# Patient Record
Sex: MALE | Race: WHITE | NOT HISPANIC OR LATINO | Employment: FULL TIME | URBAN - NONMETROPOLITAN AREA
[De-identification: names, ages, dates, MRNs, and addresses within clinical notes are randomized per-mention and may not be internally consistent; named-entity substitution may affect disease eponyms.]

---

## 2019-01-16 DIAGNOSIS — M25.562 LEFT KNEE PAIN, UNSPECIFIED CHRONICITY: Primary | ICD-10-CM

## 2025-04-14 ENCOUNTER — OFFICE VISIT (OUTPATIENT)
Dept: GASTROENTEROLOGY | Facility: CLINIC | Age: 40
End: 2025-04-14
Payer: COMMERCIAL

## 2025-04-14 VITALS
WEIGHT: 152 LBS | DIASTOLIC BLOOD PRESSURE: 60 MMHG | SYSTOLIC BLOOD PRESSURE: 115 MMHG | HEIGHT: 70 IN | HEART RATE: 67 BPM | BODY MASS INDEX: 21.76 KG/M2

## 2025-04-14 DIAGNOSIS — K52.9 COLITIS: ICD-10-CM

## 2025-04-14 DIAGNOSIS — K76.9 HEPATIC LESION: ICD-10-CM

## 2025-04-14 DIAGNOSIS — B18.2 CHRONIC HEPATITIS C WITHOUT HEPATIC COMA: Primary | ICD-10-CM

## 2025-04-14 DIAGNOSIS — R63.4 UNINTENTIONAL WEIGHT LOSS: ICD-10-CM

## 2025-04-14 LAB
ALBUMIN SERPL-MCNC: 3.8 G/DL (ref 3.5–5.2)
ALBUMIN/GLOB SERPL: 1.5 G/DL
ALP SERPL-CCNC: 80 U/L (ref 39–117)
ALT SERPL W P-5'-P-CCNC: 53 U/L (ref 1–41)
ANION GAP SERPL CALCULATED.3IONS-SCNC: 11.9 MMOL/L (ref 5–15)
AST SERPL-CCNC: 49 U/L (ref 1–40)
BILIRUB SERPL-MCNC: 0.4 MG/DL (ref 0–1.2)
BUN SERPL-MCNC: 13 MG/DL (ref 6–20)
BUN/CREAT SERPL: 14.9 (ref 7–25)
CALCIUM SPEC-SCNC: 9.2 MG/DL (ref 8.6–10.5)
CHLORIDE SERPL-SCNC: 104 MMOL/L (ref 98–107)
CO2 SERPL-SCNC: 23.1 MMOL/L (ref 22–29)
CREAT SERPL-MCNC: 0.87 MG/DL (ref 0.76–1.27)
EGFRCR SERPLBLD CKD-EPI 2021: 113.3 ML/MIN/1.73
GLOBULIN UR ELPH-MCNC: 2.6 GM/DL
GLUCOSE SERPL-MCNC: 107 MG/DL (ref 65–99)
HAV IGM SERPL QL IA: ABNORMAL
HBV CORE IGM SERPL QL IA: ABNORMAL
HBV SURFACE AG SERPL QL IA: ABNORMAL
HCV AB SER QL: REACTIVE
POTASSIUM SERPL-SCNC: 3.9 MMOL/L (ref 3.5–5.2)
PROT SERPL-MCNC: 6.4 G/DL (ref 6–8.5)
SODIUM SERPL-SCNC: 139 MMOL/L (ref 136–145)

## 2025-04-14 PROCEDURE — 99204 OFFICE O/P NEW MOD 45 MIN: CPT

## 2025-04-14 PROCEDURE — 87522 HEPATITIS C REVRS TRNSCRPJ: CPT

## 2025-04-14 PROCEDURE — 1159F MED LIST DOCD IN RCRD: CPT

## 2025-04-14 PROCEDURE — 80053 COMPREHEN METABOLIC PANEL: CPT

## 2025-04-14 PROCEDURE — 80074 ACUTE HEPATITIS PANEL: CPT

## 2025-04-14 PROCEDURE — 1160F RVW MEDS BY RX/DR IN RCRD: CPT

## 2025-04-14 RX ORDER — SOD SULF/POT CHLORIDE/MAG SULF 1.479 G
24 TABLET ORAL TAKE AS DIRECTED
Qty: 24 TABLET | Refills: 0 | Status: SHIPPED | OUTPATIENT
Start: 2025-04-14

## 2025-04-14 RX ORDER — GABAPENTIN 800 MG/1
TABLET ORAL
COMMUNITY

## 2025-04-14 RX ORDER — OMEPRAZOLE 20 MG/1
CAPSULE, DELAYED RELEASE ORAL EVERY 24 HOURS
COMMUNITY

## 2025-04-14 RX ORDER — ERGOCALCIFEROL 1.25 MG/1
CAPSULE, LIQUID FILLED ORAL
COMMUNITY
Start: 2025-04-09

## 2025-04-14 NOTE — PROGRESS NOTES
"Date of Consultation:  4/14/2025  Referring Physician: ISAAK Sommers    Chief Complaint  Abdominal Pain    James Hunt is a 39 y.o. male who presents today to [facility] for Abdominal Pain.    HPI:   39-year-old male with PMH of hepatitis C s/p IVDU who presents today for evaluation of abdominal pain.  Patient was diagnosed with C. difficile colitis in July 2024.  He subsequently completed a course of vancomycin.  On December 28, 2024, patient presented to [facility] ED with abdominal pain, vomiting, and diarrhea.  At the time, patient endorses drinking 2-3 shots of alcohol overnight.  He had a CT abdomen pelvis performed that was notable for mild nonspecific diffuse colitis medicals likely infectious etiology, possible cystitis, indeterminate 2.6 cm hypodense lesion in the right hemiliver which was grossly unchanged from prior exam.  Patient was discharged from the ER on Bentyl and Zofran.  He subsequently Timmy presented on 1/1/2025 with similar complaints.  At the time, his GI PCR was positive for C. difficile and enteropathic E. coli.  C. difficile antigen was not collected.  Patient was treated with metronidazole outpatient.  He reports that he lost 10-15 pounds in December and has been unable to gain it back.  Currently, he is having 2-3 bowel movements per day that he rates as BSS 3-4.  He has complete evacuation of the colon without excessive straining.  He denies hematochezia, bright red bleeding per rectum, or melena.  He notes having occasional left upper quadrant abdominal pain that \"feels like it swollen.\"  This is not happened since January during most recent colitis flare.  He reports having frequent GERD if he eats spicy foods.  He is currently on omeprazole 20 mg daily states that he has acid reflux a few times each week.  He denies fever, chills, nausea, vomiting, dysphagia, melena, or hematochezia.             Previous History: " "  Past Medical History:   Diagnosis Date    Hepatitis C       History reviewed. No pertinent surgical history.   Social History     Socioeconomic History    Marital status: Single   Tobacco Use    Smoking status: Every Day     Types: Cigarettes    Smokeless tobacco: Never   Vaping Use    Vaping status: Some Days    Substances: Nicotine   Substance and Sexual Activity    Alcohol use: Yes     Comment: social    Drug use: Defer    Sexual activity: Defer        Current Medications:  Current Outpatient Medications   Medication Sig Dispense Refill    gabapentin (NEURONTIN) 800 MG tablet 1 tablet Orally three times daily.      omeprazole (priLOSEC) 20 MG capsule Daily.      tiZANidine (ZANAFLEX) 4 MG tablet Daily.      vitamin D (ERGOCALCIFEROL) 1.25 MG (41204 UT) capsule capsule       Sodium Sulfate-Mag Sulfate-KCl (Sutab) 0010-105-615 MG tablet Take 24 tablets by mouth Take As Directed. Take 12 tablets at 12 PM with 16 ounces of water and repeat at 6 PM 24 tablet 0     No current facility-administered medications for this visit.       Allergies:   No Known Allergies    Vitals:   /60   Pulse 67   Ht 177.8 cm (70\")   Wt 68.9 kg (152 lb)   BMI 21.81 kg/m²   Estimated body mass index is 21.81 kg/m² as calculated from the following:    Height as of this encounter: 177.8 cm (70\").    Weight as of this encounter: 68.9 kg (152 lb).    James Hunt  reports that he has been smoking cigarettes. He has never used smokeless tobacco. I have educated him on the risk of diseases from using tobacco products such as cancer, COPD, and heart disease.       ROS:   Review of Systems   Constitutional: Negative.    HENT: Negative.     Cardiovascular: Negative.    Gastrointestinal:  Positive for abdominal pain. Negative for abdominal distention, anal bleeding, blood in stool, constipation, diarrhea, nausea, rectal pain and vomiting.   All other systems reviewed and are negative.       Physical Exam:   Physical Exam  Vitals reviewed. " "Exam conducted with a chaperone present.   Constitutional:       General: He is not in acute distress.     Appearance: Normal appearance. He is well-groomed and normal weight. He is not toxic-appearing.   HENT:      Head: Normocephalic and atraumatic.      Mouth/Throat:      Mouth: Mucous membranes are moist.   Eyes:      Extraocular Movements: Extraocular movements intact.   Cardiovascular:      Rate and Rhythm: Normal rate and regular rhythm.      Heart sounds: Normal heart sounds. No murmur heard.  Pulmonary:      Effort: Pulmonary effort is normal. No respiratory distress.      Breath sounds: Normal breath sounds. No stridor. No wheezing or rales.   Abdominal:      General: Bowel sounds are normal. There is no distension.      Palpations: Abdomen is soft. There is no mass.      Tenderness: There is no abdominal tenderness. There is no guarding or rebound.      Hernia: No hernia is present.   Skin:     General: Skin is warm.      Coloration: Skin is not jaundiced.      Findings: No rash.   Neurological:      Mental Status: He is alert and oriented to person, place, and time.   Psychiatric:         Mood and Affect: Mood and affect normal.         Speech: Speech normal.         Behavior: Behavior normal. Behavior is cooperative.         Thought Content: Thought content normal.          Lab Results:   No results found for: \"WBC\", \"HGB\", \"HCT\", \"MCV\", \"RDW\", \"PLT\", \"NEUTRORELPCT\", \"LYMPHORELPCT\", \"MONORELPCT\", \"EOSRELPCT\", \"BASORELPCT\", \"NEUTROABS\", \"LYMPHSABS\"    Lab Results   Component Value Date    MG 2.7 (H) 01/01/2025       Pathology:        Endoscopy:        Imaging:   CT Abdomen Pelvis With Contrast  Result Date: 12/29/2024  Findings are concerning for mild nonspecific diffuse colitis, most likely infectious etiology. Possible cystitis. Other findings as above. Images personally reviewed, interpreted and dictated by NGOZI Oh.    XR Spine Lumbar AP & Lateral  Result Date: 12/24/2024  No acute " process. THORACIC SPINE SERIES HISTORY: Worsening chronic mid back pain COMPARISON: 7/5/2022 FINDINGS: Two views of the thoracic spine were obtained. The pedicles are intact. The intervertebral disc spaces are normal in height with no degenerative changes.  There is no subluxation or fracture. The vertebral bodies are normal in height. IMPRESSION: No acute abnormality. LUMBAR SPINE SERIES HISTORY: Worsening chronic low back pain COMPARISON: 7/5/2022 FINDINGS: 5 views of the lumbar spine were obtained. The pedicles are intact. The disk spaces are well preserved. There is no acute fracture. There is no significant subluxation. IMPRESSION: No acute process. Images reviewed, interpreted, and dictated by Dr. Alejandro Stein. Transcribed by Banyan Spine Thoracic 3 View  Result Date: 12/24/2024  No acute process. THORACIC SPINE SERIES HISTORY: Worsening chronic mid back pain COMPARISON: 7/5/2022 FINDINGS: Two views of the thoracic spine were obtained. The pedicles are intact. The intervertebral disc spaces are normal in height with no degenerative changes.  There is no subluxation or fracture. The vertebral bodies are normal in height. IMPRESSION: No acute abnormality. LUMBAR SPINE SERIES HISTORY: Worsening chronic low back pain COMPARISON: 7/5/2022 FINDINGS: 5 views of the lumbar spine were obtained. The pedicles are intact. The disk spaces are well preserved. There is no acute fracture. There is no significant subluxation. IMPRESSION: No acute process. Images reviewed, interpreted, and dictated by Dr. Alejandro Stein. Transcribed by Banyan Spine Cervical 2 or 3 View  Result Date: 12/24/2024  No acute process. THORACIC SPINE SERIES HISTORY: Worsening chronic mid back pain COMPARISON: 7/5/2022 FINDINGS: Two views of the thoracic spine were obtained. The pedicles are intact. The intervertebral disc spaces are normal in height with no degenerative changes.  There is no subluxation or fracture. The  vertebral bodies are normal in height. IMPRESSION: No acute abnormality. LUMBAR SPINE SERIES HISTORY: Worsening chronic low back pain COMPARISON: 7/5/2022 FINDINGS: 5 views of the lumbar spine were obtained. The pedicles are intact. The disk spaces are well preserved. There is no acute fracture. There is no significant subluxation. IMPRESSION: No acute process. Images reviewed, interpreted, and dictated by Dr. Alejandro Stein. Transcribed by Bren Daniel    X-ray chest PA and lateral  Result Date: 12/24/2024  No acute cardiopulmonary process. Images reviewed, interpreted, and dictated by Dr. Alejandro Stein. Transcribed by Francine Ramos        Results review: During today's encounter, all relevant clinical data has been reviewed.      Assessment and Plan    1. Chronic hepatitis C without hepatic coma (Primary)  Patient verbalizes that he would like to seek treatment if his hepatitis C is still active.  He is not sure if this is still active.  Will obtain the following labs today.  If positive HCV viral load, will send to hepatitis C clinic for treatment.  -     Hepatitis Panel, Acute  -     Hepatitis C RNA, Quantitative, PCR (graph); Future  -     Comprehensive Metabolic Panel; Future  -     Hepatitis C RNA, Quantitative, PCR (graph)  -     Comprehensive Metabolic Panel    2. Colitis  3. Unintentional weight loss  Will obtain colonoscopy for further evaluation of recurrent colitis and unintentional weight loss.  Patient was educated on risk and benefits of procedure.  He verbalized understanding is amenable to proceeding as above.  -     Case Request; Standing  -     Follow Anesthesia Guidelines / Protocol; Future  -     Case Request  -     Sodium Sulfate-Mag Sulfate-KCl (Sutab) 5294-455-927 MG tablet; Take 24 tablets by mouth Take As Directed. Take 12 tablets at 12 PM with 16 ounces of water and repeat at 6 PM  Dispense: 24 tablet; Refill: 0    4. Hepatic lesion  Will repeat ultrasound for further evaluation of  liver lesion.  -     US Liver; Future  -     Comprehensive Metabolic Panel; Future  -     Comprehensive Metabolic Panel    New Medications:   New Medications Ordered This Visit   Medications    Sodium Sulfate-Mag Sulfate-KCl (Sutab) 8826-556-746 MG tablet     Sig: Take 24 tablets by mouth Take As Directed. Take 12 tablets at 12 PM with 16 ounces of water and repeat at 6 PM     Dispense:  24 tablet     Refill:  0       Discontinued Medications:   There are no discontinued medications.     Visit Diagnoses:    ICD-10-CM ICD-9-CM   1. Chronic hepatitis C without hepatic coma  B18.2 070.54   2. Colitis  K52.9 558.9   3. Unintentional weight loss  R63.4 783.21   4. Hepatic lesion  K76.9 573.8            Follow Up:   Return in about 2 months (around 6/14/2025).    The patient was in agreement with the plan and all questions were answered to patient's satisfaction.        This document has been electronically signed by Kandis Guidry PA-C   April 14, 2025 11:48 EDT    Dictated Utilizing Dragon Dictation: Part of this note may be an electronic transcription/translation of spoken language to printed text using the Dragon Dictation System.    CC:  Jan Bryant, Nadira Maldonado APRN

## 2025-04-16 LAB
HCV RNA SERPL NAA+PROBE-ACNC: NORMAL IU/ML
HCV RNA SERPL NAA+PROBE-LOG IU: 4.79 LOG10 IU/ML
REF LAB TEST REF RANGE: NORMAL

## 2025-04-17 ENCOUNTER — RESULTS FOLLOW-UP (OUTPATIENT)
Dept: GASTROENTEROLOGY | Facility: CLINIC | Age: 40
End: 2025-04-17
Payer: COMMERCIAL

## 2025-04-17 NOTE — TELEPHONE ENCOUNTER
Attempted to call patient regarding recent lab work.  Unfortunately, was unable to reach patient.  I did leave voicemail requesting callback.

## 2025-04-21 ENCOUNTER — TELEPHONE (OUTPATIENT)
Dept: UROLOGY | Facility: CLINIC | Age: 40
End: 2025-04-21

## 2025-04-23 DIAGNOSIS — B18.2 CHRONIC HEPATITIS C WITHOUT HEPATIC COMA: Primary | ICD-10-CM

## 2025-05-12 ENCOUNTER — TELEPHONE (OUTPATIENT)
Dept: GASTROENTEROLOGY | Facility: CLINIC | Age: 40
End: 2025-05-12

## 2025-05-12 NOTE — TELEPHONE ENCOUNTER
Caller: Collett,Delissa    Relationship: Mother    Best call back number: [phone]     Requested Prescriptions:   Sodium Sulfate-Mag Sulfate-KCl (Sutab) 4795-740-861 MG tablet [[id number]] (Order [id number])     Pharmacy where request should be sent: [facility]/PHARMACY #[id number] - [address] [phone] [facility] [phone]      Last office visit with prescribing clinician: 2025   Next office visit with prescribing clinician: 2025     Additional details provided by patient: PATIENT ATTEMPTED TO FILL AT PHARMACY BUT WAS TOLD THE PRESCRIPTION WAS UNAVAILABLE/.    Does the patient have less than a 3 day supply:  [x] Yes  [] No    Would you like a call back once the refill request has been completed: [] Yes [x] No    If the office needs to give you a call back, can they leave a voicemail: [] Yes [x] No

## 2025-05-13 DIAGNOSIS — R63.4 UNINTENTIONAL WEIGHT LOSS: ICD-10-CM

## 2025-05-13 DIAGNOSIS — K52.9 COLITIS: ICD-10-CM

## 2025-05-13 RX ORDER — SOD SULF/POT CHLORIDE/MAG SULF 1.479 G
24 TABLET ORAL TAKE AS DIRECTED
Qty: 24 TABLET | Refills: 0 | Status: SHIPPED | OUTPATIENT
Start: 2025-05-13

## 2025-05-14 ENCOUNTER — HOSPITAL ENCOUNTER (OUTPATIENT)
Facility: HOSPITAL | Age: 40
Discharge: HOME | End: 2025-05-14
Payer: COMMERCIAL

## 2025-05-14 DIAGNOSIS — K76.9 HEPATIC LESION: ICD-10-CM

## 2025-05-14 PROCEDURE — 76705 ECHO EXAM OF ABDOMEN: CPT

## 2025-05-14 PROCEDURE — 76705 ECHO EXAM OF ABDOMEN: CPT | Performed by: RADIOLOGY

## 2025-06-03 ENCOUNTER — ANESTHESIA (OUTPATIENT)
Dept: PERIOP | Facility: HOSPITAL | Age: 40
End: 2025-06-03
Payer: COMMERCIAL

## 2025-06-03 ENCOUNTER — ANESTHESIA EVENT (OUTPATIENT)
Dept: PERIOP | Facility: HOSPITAL | Age: 40
End: 2025-06-03
Payer: COMMERCIAL

## 2025-06-03 ENCOUNTER — APPOINTMENT (OUTPATIENT)
Dept: CT IMAGING | Facility: HOSPITAL | Age: 40
End: 2025-06-03
Payer: COMMERCIAL

## 2025-06-03 ENCOUNTER — TELEPHONE (OUTPATIENT)
Dept: GASTROENTEROLOGY | Facility: CLINIC | Age: 40
End: 2025-06-03

## 2025-06-03 ENCOUNTER — HOSPITAL ENCOUNTER (OUTPATIENT)
Facility: HOSPITAL | Age: 40
Setting detail: HOSPITAL OUTPATIENT SURGERY
Discharge: HOME | End: 2025-06-03
Attending: INTERNAL MEDICINE | Admitting: INTERNAL MEDICINE
Payer: COMMERCIAL

## 2025-06-03 ENCOUNTER — HOSPITAL ENCOUNTER (EMERGENCY)
Facility: HOSPITAL | Age: 40
Discharge: HOME | End: 2025-06-04
Payer: COMMERCIAL

## 2025-06-03 VITALS
RESPIRATION RATE: 16 BRPM | OXYGEN SATURATION: 100 % | TEMPERATURE: 97.9 F | SYSTOLIC BLOOD PRESSURE: 129 MMHG | DIASTOLIC BLOOD PRESSURE: 76 MMHG | WEIGHT: 145 LBS | HEIGHT: 70 IN | BODY MASS INDEX: 20.76 KG/M2 | HEART RATE: 47 BPM

## 2025-06-03 DIAGNOSIS — R10.84 GENERALIZED ABDOMINAL PAIN: Primary | ICD-10-CM

## 2025-06-03 DIAGNOSIS — R63.4 UNINTENTIONAL WEIGHT LOSS: ICD-10-CM

## 2025-06-03 DIAGNOSIS — K52.9 COLITIS: ICD-10-CM

## 2025-06-03 LAB
ALBUMIN SERPL-MCNC: 4.4 G/DL (ref 3.5–5.2)
ALBUMIN/GLOB SERPL: 1.6 G/DL
ALP SERPL-CCNC: 85 U/L (ref 39–117)
ALT SERPL W P-5'-P-CCNC: 56 U/L (ref 1–41)
ANION GAP SERPL CALCULATED.3IONS-SCNC: 13.4 MMOL/L (ref 5–15)
AST SERPL-CCNC: 39 U/L (ref 1–40)
BASOPHILS # BLD AUTO: 0 10*3/MM3 (ref 0–0.2)
BASOPHILS NFR BLD AUTO: 0 % (ref 0–1.5)
BILIRUB SERPL-MCNC: 0.8 MG/DL (ref 0–1.2)
BUN SERPL-MCNC: 17.9 MG/DL (ref 6–20)
BUN/CREAT SERPL: 23.6 (ref 7–25)
CALCIUM SPEC-SCNC: 9.2 MG/DL (ref 8.6–10.5)
CHLORIDE SERPL-SCNC: 107 MMOL/L (ref 98–107)
CO2 SERPL-SCNC: 20.6 MMOL/L (ref 22–29)
CREAT SERPL-MCNC: 0.76 MG/DL (ref 0.76–1.27)
CRP SERPL-MCNC: <0.3 MG/DL (ref 0–0.5)
D-LACTATE SERPL-SCNC: 1.2 MMOL/L (ref 0.5–2)
DEPRECATED RDW RBC AUTO: 44.3 FL (ref 37–54)
EGFRCR SERPLBLD CKD-EPI 2021: 118 ML/MIN/1.73
EOSINOPHIL # BLD AUTO: 0 10*3/MM3 (ref 0–0.4)
EOSINOPHIL NFR BLD AUTO: 0 % (ref 0.3–6.2)
ERYTHROCYTE [DISTWIDTH] IN BLOOD BY AUTOMATED COUNT: 12.9 % (ref 12.3–15.4)
GLOBULIN UR ELPH-MCNC: 2.7 GM/DL
GLUCOSE SERPL-MCNC: 122 MG/DL (ref 65–99)
HCT VFR BLD AUTO: 40.6 % (ref 37.5–51)
HGB BLD-MCNC: 13.6 G/DL (ref 13–17.7)
HOLD SPECIMEN: NORMAL
HOLD SPECIMEN: NORMAL
IMM GRANULOCYTES # BLD AUTO: 0 10*3/MM3 (ref 0–0.05)
IMM GRANULOCYTES NFR BLD AUTO: 0 % (ref 0–0.5)
LYMPHOCYTES # BLD AUTO: 0.65 10*3/MM3 (ref 0.7–3.1)
LYMPHOCYTES NFR BLD AUTO: 13.5 % (ref 19.6–45.3)
MCH RBC QN AUTO: 31.1 PG (ref 26.6–33)
MCHC RBC AUTO-ENTMCNC: 33.5 G/DL (ref 31.5–35.7)
MCV RBC AUTO: 92.9 FL (ref 79–97)
MONOCYTES # BLD AUTO: 0.15 10*3/MM3 (ref 0.1–0.9)
MONOCYTES NFR BLD AUTO: 3.1 % (ref 5–12)
NEUTROPHILS NFR BLD AUTO: 4.01 10*3/MM3 (ref 1.7–7)
NEUTROPHILS NFR BLD AUTO: 83.4 % (ref 42.7–76)
NRBC BLD AUTO-RTO: 0 /100 WBC (ref 0–0.2)
PLATELET # BLD AUTO: 187 10*3/MM3 (ref 140–450)
PMV BLD AUTO: 10.8 FL (ref 6–12)
POTASSIUM SERPL-SCNC: 3.8 MMOL/L (ref 3.5–5.2)
PROCALCITONIN SERPL-MCNC: 0.03 NG/ML (ref 0–0.25)
PROT SERPL-MCNC: 7.1 G/DL (ref 6–8.5)
RBC # BLD AUTO: 4.37 10*6/MM3 (ref 4.14–5.8)
SODIUM SERPL-SCNC: 141 MMOL/L (ref 136–145)
WBC NRBC COR # BLD AUTO: 4.81 10*3/MM3 (ref 3.4–10.8)
WHOLE BLOOD HOLD COAG: NORMAL
WHOLE BLOOD HOLD SPECIMEN: NORMAL

## 2025-06-03 PROCEDURE — 25010000002 PROPOFOL 10 MG/ML EMULSION: Performed by: NURSE ANESTHETIST, CERTIFIED REGISTERED

## 2025-06-03 PROCEDURE — 25810000003 SEPSIS FLUID NS 0.9 % SOLUTION: Performed by: PHYSICIAN ASSISTANT

## 2025-06-03 PROCEDURE — 87040 BLOOD CULTURE FOR BACTERIA: CPT

## 2025-06-03 PROCEDURE — 25010000002 PROPOFOL 200 MG/20ML EMULSION: Performed by: NURSE ANESTHETIST, CERTIFIED REGISTERED

## 2025-06-03 PROCEDURE — 80053 COMPREHEN METABOLIC PANEL: CPT

## 2025-06-03 PROCEDURE — 86140 C-REACTIVE PROTEIN: CPT

## 2025-06-03 PROCEDURE — 25010000002 ONDANSETRON PER 1 MG: Performed by: NURSE ANESTHETIST, CERTIFIED REGISTERED

## 2025-06-03 PROCEDURE — 83605 ASSAY OF LACTIC ACID: CPT

## 2025-06-03 PROCEDURE — 93005 ELECTROCARDIOGRAM TRACING: CPT

## 2025-06-03 PROCEDURE — 36415 COLL VENOUS BLD VENIPUNCTURE: CPT

## 2025-06-03 PROCEDURE — 93010 ELECTROCARDIOGRAM REPORT: CPT | Performed by: INTERNAL MEDICINE

## 2025-06-03 PROCEDURE — 74177 CT ABD & PELVIS W/CONTRAST: CPT

## 2025-06-03 PROCEDURE — 85025 COMPLETE CBC W/AUTO DIFF WBC: CPT

## 2025-06-03 PROCEDURE — 45385 COLONOSCOPY W/LESION REMOVAL: CPT | Performed by: INTERNAL MEDICINE

## 2025-06-03 PROCEDURE — 25810000003 LACTATED RINGERS PER 1000 ML: Performed by: ANESTHESIOLOGY

## 2025-06-03 PROCEDURE — 84145 PROCALCITONIN (PCT): CPT | Performed by: PHYSICIAN ASSISTANT

## 2025-06-03 PROCEDURE — 99285 EMERGENCY DEPT VISIT HI MDM: CPT

## 2025-06-03 RX ORDER — IPRATROPIUM BROMIDE AND ALBUTEROL SULFATE 2.5; .5 MG/3ML; MG/3ML
3 SOLUTION RESPIRATORY (INHALATION) ONCE AS NEEDED
Status: DISCONTINUED | OUTPATIENT
Start: 2025-06-03 | End: 2025-06-03 | Stop reason: HOSPADM

## 2025-06-03 RX ORDER — SODIUM CHLORIDE 9 MG/ML
40 INJECTION, SOLUTION INTRAVENOUS AS NEEDED
Status: DISCONTINUED | OUTPATIENT
Start: 2025-06-03 | End: 2025-06-03 | Stop reason: HOSPADM

## 2025-06-03 RX ORDER — MIDAZOLAM HYDROCHLORIDE 1 MG/ML
1 INJECTION, SOLUTION INTRAMUSCULAR; INTRAVENOUS
Status: DISCONTINUED | OUTPATIENT
Start: 2025-06-03 | End: 2025-06-03 | Stop reason: HOSPADM

## 2025-06-03 RX ORDER — SODIUM CHLORIDE 0.9 % (FLUSH) 0.9 %
10 SYRINGE (ML) INJECTION EVERY 12 HOURS SCHEDULED
Status: DISCONTINUED | OUTPATIENT
Start: 2025-06-03 | End: 2025-06-03 | Stop reason: HOSPADM

## 2025-06-03 RX ORDER — MEPERIDINE HYDROCHLORIDE 25 MG/ML
12.5 INJECTION INTRAMUSCULAR; INTRAVENOUS; SUBCUTANEOUS
Status: DISCONTINUED | OUTPATIENT
Start: 2025-06-03 | End: 2025-06-03 | Stop reason: HOSPADM

## 2025-06-03 RX ORDER — SODIUM CHLORIDE, SODIUM LACTATE, POTASSIUM CHLORIDE, CALCIUM CHLORIDE 600; 310; 30; 20 MG/100ML; MG/100ML; MG/100ML; MG/100ML
100 INJECTION, SOLUTION INTRAVENOUS ONCE AS NEEDED
Status: DISCONTINUED | OUTPATIENT
Start: 2025-06-03 | End: 2025-06-03 | Stop reason: HOSPADM

## 2025-06-03 RX ORDER — OXYCODONE AND ACETAMINOPHEN 5; 325 MG/1; MG/1
1 TABLET ORAL ONCE AS NEEDED
Status: DISCONTINUED | OUTPATIENT
Start: 2025-06-03 | End: 2025-06-03 | Stop reason: HOSPADM

## 2025-06-03 RX ORDER — PROPOFOL 10 MG/ML
INJECTION, EMULSION INTRAVENOUS AS NEEDED
Status: DISCONTINUED | OUTPATIENT
Start: 2025-06-03 | End: 2025-06-03 | Stop reason: SURG

## 2025-06-03 RX ORDER — SODIUM CHLORIDE, SODIUM LACTATE, POTASSIUM CHLORIDE, CALCIUM CHLORIDE 600; 310; 30; 20 MG/100ML; MG/100ML; MG/100ML; MG/100ML
125 INJECTION, SOLUTION INTRAVENOUS ONCE
Status: COMPLETED | OUTPATIENT
Start: 2025-06-03 | End: 2025-06-03

## 2025-06-03 RX ORDER — SODIUM CHLORIDE 0.9 % (FLUSH) 0.9 %
10 SYRINGE (ML) INJECTION AS NEEDED
Status: DISCONTINUED | OUTPATIENT
Start: 2025-06-03 | End: 2025-06-04 | Stop reason: HOSPADM

## 2025-06-03 RX ORDER — SODIUM CHLORIDE 0.9 % (FLUSH) 0.9 %
10 SYRINGE (ML) INJECTION AS NEEDED
Status: DISCONTINUED | OUTPATIENT
Start: 2025-06-03 | End: 2025-06-03 | Stop reason: HOSPADM

## 2025-06-03 RX ORDER — ONDANSETRON 2 MG/ML
4 INJECTION INTRAMUSCULAR; INTRAVENOUS AS NEEDED
Status: DISCONTINUED | OUTPATIENT
Start: 2025-06-03 | End: 2025-06-03 | Stop reason: HOSPADM

## 2025-06-03 RX ADMIN — ONDANSETRON 4 MG: 2 INJECTION INTRAMUSCULAR; INTRAVENOUS at 11:13

## 2025-06-03 RX ADMIN — PROPOFOL 110 MG: 10 INJECTION, EMULSION INTRAVENOUS at 10:45

## 2025-06-03 RX ADMIN — PROPOFOL 160 MCG/KG/MIN: 10 INJECTION, EMULSION INTRAVENOUS at 10:45

## 2025-06-03 RX ADMIN — SODIUM CHLORIDE 1974 ML: 9 INJECTION, SOLUTION INTRAVENOUS at 22:29

## 2025-06-03 RX ADMIN — SODIUM CHLORIDE, POTASSIUM CHLORIDE, SODIUM LACTATE AND CALCIUM CHLORIDE: 600; 310; 30; 20 INJECTION, SOLUTION INTRAVENOUS at 10:40

## 2025-06-03 RX ADMIN — PROPOFOL 40 MG: 10 INJECTION, EMULSION INTRAVENOUS at 10:48

## 2025-06-03 NOTE — ANESTHESIA POSTPROCEDURE EVALUATION
Patient: James Hunt    Procedure Summary       Date: 06/03/25 Room / Location:  [facility] OR  /  [facility]    Anesthesia Start: 1040 Anesthesia Stop: 1100    Procedure: COLONOSCOPY Diagnosis:       Colitis      Unintentional weight loss      (Colitis [K52.9])      (Unintentional weight loss [R63.4])    Surgeons: Sully Hoskins MD Provider: Kobi Gerber MD    Anesthesia Type: general ASA Status: 2            Anesthesia Type: general    Vitals  Vitals Value Taken Time   /76 06/03/25 11:32   Temp 97.9 °F (36.6 °C) 06/03/25 11:02   Pulse 42 06/03/25 11:33   Resp 16 06/03/25 11:32   SpO2 100 % 06/03/25 11:33   Vitals shown include unfiled device data.        Post Anesthesia Care and Evaluation    Patient location during evaluation: PHASE II  Patient participation: complete - patient participated  Level of consciousness: awake and alert  Pain score: 1  Pain management: adequate    Airway patency: patent  Anesthetic complications: No anesthetic complications  PONV Status: controlled  Cardiovascular status: acceptable  Respiratory status: acceptable  Hydration status: acceptable

## 2025-06-03 NOTE — TELEPHONE ENCOUNTER
Caller: Collett,Delissa    Relationship: Mother    Best call back number: [phone]    What form or medical record are you requesting: WORK NOTE    Who is requesting this form or medical record from you: PT    How would you like to receive the form or medical records (pick-up, mail, fax):   If fax, what is the fax number:   If mail, what is the address:   If pick-up, provide patient with address and location details    Timeframe paperwork needed: TODAY    Additional notes: PT HAD COLONOSCOPY PERFORMED TODAY AND HAD HAD TO TAKE HIS PREP YESTERDAY ASKED FOR A WORK NOTE FOR TODAY AND YESTERDAY MOM ASKED FOR A CALL BACK ONCE NOTE IS READY FOR

## 2025-06-03 NOTE — ANESTHESIA PREPROCEDURE EVALUATION
Anesthesia Evaluation     Patient summary reviewed and Nursing notes reviewed   no history of anesthetic complications:   NPO Solid Status: > 8 hours  NPO Liquid Status: > 8 hours           Airway   Mallampati: II  TM distance: >3 FB  Neck ROM: full  Dental    (+) poor dentition        Pulmonary     breath sounds clear to auscultation  (+) a smoker Current,  Cardiovascular   Exercise tolerance: good (4-7 METS)    Rhythm: regular  Rate: normal        Neuro/Psych- negative ROS  GI/Hepatic/Renal/Endo    (+) hepatitis C, liver disease    Musculoskeletal (-) negative ROS    Abdominal     Abdomen: soft.   Substance History - negative use     OB/GYN negative ob/gyn ROS         Other - negative ROS                   Anesthesia Plan    ASA 2     general     intravenous induction     Anesthetic plan, risks, benefits, and alternatives have been provided, discussed and informed consent has been obtained with: patient.  Pre-procedure education provided  Use of blood products discussed with  Consented to blood products.    Plan discussed with CRNA.    CODE STATUS:

## 2025-06-03 NOTE — H&P
[facility] ERIST HISTORY AND PHYSICAL    Patient Identification:  Name:  James Hunt  Age:  39 y.o.  Sex:  male  :  1985  MRN:  [id number]   Visit Number:  [id number]  Primary Care Physician:  Nadira Vargas APRN       Chief complaint: Unintentional weight loss, colitis     History of presenting illness: Mr. Hunt is a  39 y.o. male who presents today for colonoscopy. In 2024, patient presented to [facility] ED with abdominal pain, vomiting, and diarrhea. At the time, patient reported drinking 2-3 shots of alcohol overnight. He underwent CT abdomen pelvis which was notable for mild nonspecific diffuse colitis, most likely infectious etiology. Patient was discharged from the ER on Bentyl and Zofran. He again presented on 2025 with similar complaints. At the time, his GI PCR was positive for C. difficile and enteropathic E. coli. C. difficile antigen was not collected. Patient was treated with metronidazole outpatient. He reports that he lost 10-15 pounds in December and has been unable to gain it back. His weight has been stable recently.  At present, he is having 2-3 bowel movements per day that he rates as BSS 3-4. He has complete evacuation of the colon without excessive straining. He denies hematochezia, bright red bleeding per rectum, or melena. This will be his first colonoscopy.  He denies family history of colon cancer or IBD.  He has no other complaints today.     Review of Systems   Constitutional:  Positive for unexpected weight change.   HENT: Negative.     Eyes: Negative.    Respiratory: Negative.     Cardiovascular: Negative.    Gastrointestinal:         History of c diff   Endocrine: Negative.    Genitourinary: Negative.    Musculoskeletal: Negative.    Allergic/Immunologic: Negative.    Neurological: Negative.    Hematological: Negative.         Past Medical History:   Diagnosis Date    Hepatitis C      Past Surgical History:   Procedure  "Laterality Date    TEETH EXTRACTION       Family History   Problem Relation Age of Onset    No Known Problems Mother     Colon cancer Paternal Grandfather      Social History     Socioeconomic History    Marital status: Single   Tobacco Use    Smoking status: Every Day     Current packs/day: 0.50     Average packs/day: 0.5 packs/day for 23.0 years (11.5 ttl pk-yrs)     Types: Cigarettes    Smokeless tobacco: Current     Types: Chew   Vaping Use    Vaping status: Some Days    Substances: Nicotine   Substance and Sexual Activity    Alcohol use: Yes     Comment: social    Drug use: Yes     Types: Marijuana    Sexual activity: Defer       Allergies:  Patient has no known allergies.    Prior to Admission Medications       Prescriptions Last Dose Informant Patient Reported? Taking?    gabapentin (NEURONTIN) 800 MG tablet   Yes No    1 tablet Orally three times daily.    omeprazole (priLOSEC) 20 MG capsule   Yes No    Daily.    Sodium Sulfate-Mag Sulfate-KCl (Sutab) 8537-109-722 MG tablet   No No    Take 24 tablets by mouth Take As Directed. Take 12 tablets at 12 PM with 16 ounces of water and repeat at 6 PM    tiZANidine (ZANAFLEX) 4 MG tablet   Yes No    Daily.    vitamin D (ERGOCALCIFEROL) 1.25 MG (69219 UT) capsule capsule   Yes No          Vital Signs:     Vitals:    06/03/25 1010   BP: 117/72   BP Location: Left arm   Patient Position: Lying   Pulse: 54   Resp: 16   Temp: 97.9 °F (36.6 °C)   TempSrc: Temporal   SpO2: 99%   Weight: 65.8 kg (145 lb)   Height: 177.8 cm (70\")      Body mass index is 20.81 kg/m².    Physical Exam:  Constitutional:  Alert and oriented. Well developed and well nourished, in no acute distress.  HENT:  Head: Normocephalic and atraumatic.  Mouth:  Moist mucous membranes.  OP clear, mmm  Eyes:  Conjunctivae and EOM are normal.  Pupils are equal, round, and reactive to light.  No scleral icterus.  Neck:  Neck supple.  No JVD present.    Cardiovascular:  RRR, no MRG.  Pulmonary/Chest:  CTAB, " unlabored.   Abdominal:  Soft.  Bowel sounds are normal.  No distension and no tenderness.   Musculoskeletal:  No edema, no tenderness, and no deformity.   Neurological:  MS as above, grossly nonfocal exam       Assessment and Plan:  Proceed with colonoscopy for evaluation of colitis and unintentional weight loss.    ISAAK Stevens  06/03/25  10:04 EDT

## 2025-06-03 NOTE — Clinical Note
[facility] EMERGENCY DEPARTMENT  [address] [facility] [address]  Phone: [phone]    James Hunt was seen and treated in our emergency department on 6/3/2025.  He may return to work on 06/05/2025.         Thank you for choosing [facility].    Jaylon Young MD

## 2025-06-04 ENCOUNTER — RESULTS FOLLOW-UP (OUTPATIENT)
Dept: PERIOP | Facility: HOSPITAL | Age: 40
End: 2025-06-04
Payer: COMMERCIAL

## 2025-06-04 VITALS
DIASTOLIC BLOOD PRESSURE: 85 MMHG | HEART RATE: 50 BPM | BODY MASS INDEX: 20.76 KG/M2 | SYSTOLIC BLOOD PRESSURE: 142 MMHG | OXYGEN SATURATION: 100 % | HEIGHT: 70 IN | TEMPERATURE: 97.9 F | WEIGHT: 145 LBS | RESPIRATION RATE: 14 BRPM

## 2025-06-04 LAB
QT INTERVAL: 534 MS
QTC INTERVAL: 467 MS
REF LAB TEST METHOD: NORMAL

## 2025-06-04 PROCEDURE — 25010000002 MORPHINE PER 10 MG

## 2025-06-04 PROCEDURE — 74177 CT ABD & PELVIS W/CONTRAST: CPT | Performed by: RADIOLOGY

## 2025-06-04 PROCEDURE — 25010000002 ONDANSETRON PER 1 MG

## 2025-06-04 PROCEDURE — 96374 THER/PROPH/DIAG INJ IV PUSH: CPT

## 2025-06-04 PROCEDURE — 25510000001 IOPAMIDOL 61 % SOLUTION

## 2025-06-04 PROCEDURE — 36415 COLL VENOUS BLD VENIPUNCTURE: CPT

## 2025-06-04 PROCEDURE — 96375 TX/PRO/DX INJ NEW DRUG ADDON: CPT

## 2025-06-04 RX ORDER — IOPAMIDOL 612 MG/ML
100 INJECTION, SOLUTION INTRAVASCULAR
Status: COMPLETED | OUTPATIENT
Start: 2025-06-04 | End: 2025-06-04

## 2025-06-04 RX ORDER — ONDANSETRON 2 MG/ML
4 INJECTION INTRAMUSCULAR; INTRAVENOUS ONCE
Status: COMPLETED | OUTPATIENT
Start: 2025-06-04 | End: 2025-06-04

## 2025-06-04 RX ADMIN — ONDANSETRON 4 MG: 2 INJECTION INTRAMUSCULAR; INTRAVENOUS at 00:53

## 2025-06-04 RX ADMIN — MORPHINE SULFATE 4 MG: 4 INJECTION, SOLUTION INTRAMUSCULAR; INTRAVENOUS at 01:01

## 2025-06-04 RX ADMIN — IOPAMIDOL 70 ML: 612 INJECTION, SOLUTION INTRAVENOUS at 00:14

## 2025-06-04 NOTE — DISCHARGE INSTRUCTIONS
See your GI doctor about the fluid around your gall bladder.  If your pain persist, see your GI doctor as soon as you can.

## 2025-06-04 NOTE — LETTER
June 4, 2025    James Hunt  [address] [place] [address] [place] [address]      James,     At the time of your recent colonoscopy, polyp was removed from the colon. The polyp was a tubular adenoma. Tubular adenomas are considered precancerous. Because of this, you will need repeat colonoscopy in 5 years.       Thank you,      Sully Hoskins MD

## 2025-06-04 NOTE — ED PROVIDER NOTES
Subjective   History of Present Illness  39-year-old male with hepatitis C presents to the ED status post colonoscopy due to nausea vomiting abdominal pain.  Patient had a colonoscopy earlier today and has since been having abdominal pain nausea and vomiting.  Patient has a history of frequent episodes of abdominal pain.  Per the patient his colonoscopy was negative.  He denies any rectal bleeding.    He tells me he has had problems with chronic C differential.  GRP        Review of Systems   Constitutional: Negative.  Negative for fever.   HENT: Negative.     Respiratory: Negative.     Cardiovascular: Negative.  Negative for chest pain.   Gastrointestinal:  Positive for abdominal pain, nausea and vomiting.   Endocrine: Negative.    Genitourinary: Negative.  Negative for dysuria.   Skin: Negative.    Neurological: Negative.    Psychiatric/Behavioral: Negative.     All other systems reviewed and are negative.      Past Medical History:   Diagnosis Date    Hepatitis C        No Known Allergies    Past Surgical History:   Procedure Laterality Date    TEETH EXTRACTION         Family History   Problem Relation Age of Onset    No Known Problems Mother     Colon cancer Paternal Grandfather        Social History     Socioeconomic History    Marital status: Single   Tobacco Use    Smoking status: Every Day     Current packs/day: 0.50     Average packs/day: 0.5 packs/day for 23.0 years (11.5 ttl pk-yrs)     Types: Cigarettes    Smokeless tobacco: Current     Types: Chew   Vaping Use    Vaping status: Some Days    Substances: Nicotine   Substance and Sexual Activity    Alcohol use: Yes     Comment: social    Drug use: Yes     Types: Marijuana    Sexual activity: Defer           Objective   Physical Exam  Vitals and nursing note reviewed.   Constitutional:       General: He is in acute distress.      Appearance: He is well-developed. He is ill-appearing and diaphoretic.   HENT:      Head: Normocephalic and atraumatic.       Right Ear: External ear normal.      Left Ear: External ear normal.      Nose: Nose normal.   Eyes:      Conjunctiva/sclera: Conjunctivae normal.      Pupils: Pupils are equal, round, and reactive to light.   Neck:      Vascular: No JVD.      Trachea: No tracheal deviation.   Cardiovascular:      Rate and Rhythm: Normal rate and regular rhythm.      Heart sounds: Normal heart sounds. No murmur heard.  Pulmonary:      Effort: Pulmonary effort is normal. No respiratory distress.      Breath sounds: Normal breath sounds. No wheezing.   Abdominal:      General: Bowel sounds are normal.      Palpations: Abdomen is soft.      Tenderness: There is generalized abdominal tenderness.   Musculoskeletal:         General: No deformity. Normal range of motion.      Cervical back: Normal range of motion and neck supple.   Skin:     General: Skin is warm.      Coloration: Skin is not pale.      Findings: No erythema or rash.   Neurological:      Mental Status: He is alert and oriented to person, place, and time.      Cranial Nerves: No cranial nerve deficit.   Psychiatric:         Behavior: Behavior normal.         Thought Content: Thought content normal.         Procedures           ED Course  ED Course as of 06/04/25 0451   Tue Jun 03, 2025   2302 EKG interpretation sinus bradycardia 46 bpm QTc is 467 no ST elevations or depressions.  Electronically signed by Carlyle Wilson DO, 06/03/25, 11:02 PM EDT.   [GF]   Wed Jun 04, 2025   0226 White count is 4000 with an H&H of 13 and 40.  Glucose is 122.  Patient had a colonoscopy today where check and CAT scan on him.  Lactic acid is 1.2 [GP]   0411 Gave the patient some ice chips.  We are still waiting for the CAT scan result. [GP]      ED Course User Index  [GF] Carlyle Wilson DO  [GP] Jaylon Young MD                                                       Medical Decision Making  Problems Addressed:  Generalized abdominal pain: complicated acute illness or  injury    Amount and/or Complexity of Data Reviewed  Labs: ordered.  Radiology: ordered.  ECG/medicine tests: ordered.    Risk  Prescription drug management.        Final diagnoses:   Generalized abdominal pain   Fluid around the gallbladder.    ED Disposition  ED Disposition       ED Disposition   Discharge    Condition   Stable    Comment   --               No follow-up provider specified.       Medication List      No changes were made to your prescriptions during this visit.            Jaylon Young MD  06/04/25 045

## 2025-06-04 NOTE — PROGRESS NOTES
At the time of your recent colonoscopy, polyp was removed from the colon.  The polyp was a tubular adenoma.  Tubular adenomas are considered precancerous.  Because of this, you will need repeat colonoscopy in 5 years.

## 2025-06-04 NOTE — ED NOTES
Mother at bedside states she gave patient Zofran 4mg PTA, Pt states he has not vomited since taking the Zofran.

## 2025-06-06 ENCOUNTER — TELEPHONE (OUTPATIENT)
Dept: GASTROENTEROLOGY | Facility: CLINIC | Age: 40
End: 2025-06-06

## 2025-06-06 NOTE — TELEPHONE ENCOUNTER
Caller: KARIME RICHARDSON    Relationship to patient: SELF    Best call back number: [phone]    Patient is needing: PT NEEDS WORK EXCUSE FOR BEING OUT OF WORK ON 6.05 DUE TO COMPLICATIONS FROM HIS COLONOSCOPY ON 6.03. PT HAS BEEN TO THE ER TWICE. PT WOULD LIKE US TO SEND EXCUSE LETTER TO [facility] AT FAX: [phone]

## 2025-06-06 NOTE — TELEPHONE ENCOUNTER
Caller: James Hunt    Relationship to patient: Self    Best call back number: [phone]     Patient is needing: PT HAS BEEN EXPERIENCING SYMPTOMS SINCE PROCEDURE ON 6/3/25 THAT HAVE CAUSED HIM TO MISS TIME FROM WORK. PT REQUESTS PHYSICIAN'S NOTE FOR EMPLOYER. PLEASE REVIEW AND ADVISE.

## 2025-06-08 LAB
BACTERIA SPEC AEROBE CULT: NORMAL
BACTERIA SPEC AEROBE CULT: NORMAL

## 2025-06-24 ENCOUNTER — OFFICE VISIT (OUTPATIENT)
Dept: GASTROENTEROLOGY | Facility: CLINIC | Age: 40
End: 2025-06-24
Payer: COMMERCIAL

## 2025-06-24 VITALS
HEART RATE: 65 BPM | RESPIRATION RATE: 18 BRPM | WEIGHT: 144.2 LBS | DIASTOLIC BLOOD PRESSURE: 75 MMHG | HEIGHT: 70 IN | SYSTOLIC BLOOD PRESSURE: 130 MMHG | BODY MASS INDEX: 20.64 KG/M2

## 2025-06-24 DIAGNOSIS — B18.2 CHRONIC HEPATITIS C WITHOUT HEPATIC COMA: ICD-10-CM

## 2025-06-24 DIAGNOSIS — R93.5 ABNORMAL CT OF THE ABDOMEN: Primary | ICD-10-CM

## 2025-06-24 DIAGNOSIS — R63.4 UNINTENTIONAL WEIGHT LOSS: ICD-10-CM

## 2025-06-24 PROCEDURE — 1160F RVW MEDS BY RX/DR IN RCRD: CPT

## 2025-06-24 PROCEDURE — 99214 OFFICE O/P EST MOD 30 MIN: CPT

## 2025-06-24 PROCEDURE — 1159F MED LIST DOCD IN RCRD: CPT

## 2025-06-24 NOTE — PROGRESS NOTES
"Chief Complaint  No chief complaint on file.    James Hunt is a 39 y.o. male who presents today to [facility] for No chief complaint on file..    HPI:   39-year-old male with PMH of hepatitis C s/p IVDU who presents today for evaluation of abdominal pain.  Patient was diagnosed with C. difficile colitis in July 2024.  He subsequently completed a course of vancomycin.  On December 28, 2024, patient presented to [facility] ED with abdominal pain, vomiting, and diarrhea.  At the time, patient endorses drinking 2-3 shots of alcohol overnight.  He had a CT abdomen pelvis performed that was notable for mild nonspecific diffuse colitis medicals likely infectious etiology, possible cystitis, indeterminate 2.6 cm hypodense lesion in the right hemiliver which was grossly unchanged from prior exam.  Patient was discharged from the ER on Bentyl and Zofran.  He subsequently Timmy presented on 1/1/2025 with similar complaints.  At the time, his GI PCR was positive for C. difficile and enteropathic E. coli.  C. difficile antigen was not collected.  Patient was treated with metronidazole outpatient.  He reports that he lost 10-15 pounds in December and has been unable to gain it back.  Currently, he is having 2-3 bowel movements per day that he rates as BSS 3-4.  He has complete evacuation of the colon without excessive straining.  He denies hematochezia, bright red bleeding per rectum, or melena.  He notes having occasional left upper quadrant abdominal pain that \"feels like it swollen.\"  This is not happened since January during most recent colitis flare.  He reports having frequent GERD if he eats spicy foods.  He is currently on omeprazole 20 mg daily states that he has acid reflux a few times each week.  He denies fever, chills, nausea, vomiting, dysphagia, melena, or hematochezia.  Labs are notable for fatty infiltration of the liver.  Hyperechoic nodule was noted on " liver ultrasound.  Colonoscopy performed on 6/3/2025 by Dr. Chou.  This was notable for an 8 mm polyp in sigmoid colon, internal hemorrhoids, terminal ileum was normal.  Pathology was notable for tubular adenoma.  Repeat colonoscopy recommended in 5 years.           Interval History:    Today, patient presents for follow up. Following colonoscopy, patient presented to the ED due to nausea, vomiting, and abdominal pain.  CT abdomen was performed and showed a small volume of free fluid in the right upper quadrant surrounding the gallbladder, gallbladder is partially contracted, no conclusive features of cholecystitis, also notable for hepatic hemangioma.  On last visit, patient was referred to hepatitis C clinic for further management.  However, he did not keep this appointment.  He is requesting another referral today.  He has lost 8 more pounds since last visit.  He reports no changes in his appetite.  He is eating regularly.  Reports his GERD is well controlled on omeprazole daily.  Currently having 2 bowel movements per day mostly in the mornings.  Bowel movements are formed.  He denies fever, chills, nausea, vomiting, abdominal pain, GERD, dysphagia, diarrhea, melena, and hematochezia.          Previous History:   Past Medical History:   Diagnosis Date    Hepatitis C       Past Surgical History:   Procedure Laterality Date    COLONOSCOPY N/A 6/3/2025    Procedure: COLONOSCOPY;  Surgeon: Sully Hoskins MD;  Location: [facility];  Service: Gastroenterology;  Laterality: N/A;    TEETH EXTRACTION        Social History     Socioeconomic History    Marital status: Single   Tobacco Use    Smoking status: Every Day     Current packs/day: 0.50     Average packs/day: 0.5 packs/day for 23.0 years (11.5 ttl pk-yrs)     Types: Cigarettes    Smokeless tobacco: Current     Types: Chew   Vaping Use    Vaping status: Some Days    Substances: Nicotine   Substance and Sexual Activity    Alcohol use: Yes     Comment:  "social    Drug use: Yes     Types: Marijuana    Sexual activity: Defer        Current Medications:  Current Outpatient Medications   Medication Sig Dispense Refill    gabapentin (NEURONTIN) 800 MG tablet 1 tablet Orally three times daily.      omeprazole (priLOSEC) 20 MG capsule Daily.      tiZANidine (ZANAFLEX) 4 MG tablet Daily.      vitamin D (ERGOCALCIFEROL) 1.25 MG (93301 UT) capsule capsule        No current facility-administered medications for this visit.       Allergies:   No Known Allergies    Vitals:   /75 (BP Location: Right arm, Patient Position: Sitting, Cuff Size: Adult)   Pulse 65   Resp 18   Ht 177.8 cm (70\")   Wt 65.4 kg (144 lb 3.2 oz)   BMI 20.69 kg/m²   Estimated body mass index is 20.69 kg/m² as calculated from the following:    Height as of this encounter: 177.8 cm (70\").    Weight as of this encounter: 65.4 kg (144 lb 3.2 oz).    ROS:   Review of Systems   Constitutional:  Positive for unexpected weight change.   HENT: Negative.     Respiratory: Negative.     Cardiovascular: Negative.    Gastrointestinal: Negative.    All other systems reviewed and are negative.       Physical Exam:   Physical Exam  Vitals reviewed. Exam conducted with a chaperone present.   Constitutional:       General: He is not in acute distress.     Appearance: Normal appearance. He is well-groomed and normal weight. He is not toxic-appearing.   HENT:      Head: Normocephalic and atraumatic.      Mouth/Throat:      Mouth: Mucous membranes are moist.   Eyes:      Extraocular Movements: Extraocular movements intact.   Cardiovascular:      Rate and Rhythm: Normal rate and regular rhythm.      Heart sounds: Normal heart sounds. No murmur heard.  Pulmonary:      Effort: Pulmonary effort is normal. No respiratory distress.      Breath sounds: Normal breath sounds. No stridor. No wheezing or rales.   Abdominal:      General: Bowel sounds are normal. There is no distension.      Palpations: Abdomen is soft. There is " no mass.      Tenderness: There is no abdominal tenderness. There is no guarding or rebound.      Hernia: No hernia is present.   Skin:     General: Skin is warm.      Coloration: Skin is not jaundiced.      Findings: No rash.   Neurological:      Mental Status: He is alert and oriented to person, place, and time.   Psychiatric:         Mood and Affect: Mood and affect normal.         Speech: Speech normal.         Behavior: Behavior normal. Behavior is cooperative.         Thought Content: Thought content normal.          Lab Results:   Lab Results   Component Value Date    WBC 4.81 06/03/2025    HGB 13.6 06/03/2025    HCT 40.6 06/03/2025    MCV 92.9 06/03/2025    RDW 12.9 06/03/2025     06/03/2025    NEUTRORELPCT 83.4 (H) 06/03/2025    LYMPHORELPCT 13.5 (L) 06/03/2025    MONORELPCT 3.1 (L) 06/03/2025    EOSRELPCT 0.0 (L) 06/03/2025    BASORELPCT 0.0 06/03/2025    NEUTROABS 4.01 06/03/2025    LYMPHSABS 0.65 (L) 06/03/2025       Lab Results   Component Value Date     06/03/2025    K 3.8 06/03/2025    CO2 20.6 (L) 06/03/2025     06/03/2025    BUN 17.9 06/03/2025    CREATININE 0.76 06/03/2025    GLUCOSE 122 (H) 06/03/2025    CALCIUM 9.2 06/03/2025    ALKPHOS 85 06/03/2025    AST 39 06/03/2025    ALT 56 (H) 06/03/2025    BILITOT 0.8 06/03/2025    ALBUMIN 4.4 06/03/2025    PROTEINTOT 7.1 06/03/2025    MG 2.7 (H) 01/01/2025       Pathology:        Endoscopy:        Imaging:   CT Abdomen Pelvis With Contrast  Result Date: 6/4/2025   Small volume of free fluid in the right upper quadrant surrounding the gallbladder. The gallbladder is partially contracted No conclusive features of cholecystitis Small volume of free fluid in the right upper quadrant along the anterior and posterior margin of the right hepatic lobe seen best on image 12 and 17, series 2. No free fluid in the pelvis. No free air identified in the peritoneal cavity. Small umbilical hernia contains only fat. Normal appendix is well seen. No  pneumatosis. No conclusive features of bowel perforation. Consider follow-up evaluation should symptoms persist.     This report was finalized on 6/4/2025 4:17 AM by Shailesh Almaraz MD.      US Liver  Result Date: 5/14/2025  1. Hyperechoic nodule within the liver 2. Probable fatty infiltration of the liver.   This report was finalized on 5/14/2025 10:22 AM by Dr. Mario Lieberman MD.          Results review: During today's encounter, all relevant clinical data has been reviewed.      Assessment and Plan    1. Abnormal CT of the abdomen (Primary)  Given abnormal finding surrounding bladder on CT abdomen, will proceed with HIDA scan for further evaluation of possible biliary dyskinesia.  -     NM HIDA Scan With Pharmacological Intervention; Future    2. Chronic hepatitis C without hepatic coma  Patient was referred to hepatitis C clinic at last visit.  However, he did not keep this appointment.  He is requesting another referral today.  Educated patient on importance of compliance with appointments.  -     Ambulatory Referral to Disease State Management    3. Unintentional weight loss  No known etiology at this time.  No abnormality noted on CT of the abdomen to account for unintentional weight loss.  The patient denies abdominal pain, diarrhea, or bloating.  Possible celiac disease, will consider testing on next visit.  At this time, will increase caloric intake.  Also encourage patient to drink 2 meal replacement shakes daily in addition to 3 meals a day.  He verbalized understanding.    New Medications:   No orders of the defined types were placed in this encounter.      Discontinued Medications:   There are no discontinued medications.     Visit Diagnoses:    ICD-10-CM ICD-9-CM   1. Abnormal CT of the abdomen  R93.5 793.6   2. Chronic hepatitis C without hepatic coma  B18.2 070.54   3. Unintentional weight loss  R63.4 783.21            Follow Up:   Return in about 2 months (around 8/24/2025).    The patient was in  agreement with the plan and all questions were answered to patient's satisfaction.        This document has been electronically signed by Kandis Guidry PA-C   June 25, 2025 13:26 EDT    Dictated Utilizing Dragon Dictation: Part of this note may be an electronic transcription/translation of spoken language to printed text using the Dragon Dictation System.    CC:  No ref. provider found  Nadira Vargas APRN

## 2025-06-26 ENCOUNTER — TELEPHONE (OUTPATIENT)
Dept: PHARMACY | Facility: HOSPITAL | Age: 40
End: 2025-06-26
Payer: COMMERCIAL

## 2025-07-09 ENCOUNTER — SPECIALTY PHARMACY (OUTPATIENT)
Dept: PHARMACY | Facility: HOSPITAL | Age: 40
End: 2025-07-09
Payer: COMMERCIAL

## 2025-07-09 ENCOUNTER — LAB (OUTPATIENT)
Dept: LAB | Facility: HOSPITAL | Age: 40
End: 2025-07-09
Payer: COMMERCIAL

## 2025-07-09 VITALS
WEIGHT: 143 LBS | HEIGHT: 70 IN | OXYGEN SATURATION: 99 % | SYSTOLIC BLOOD PRESSURE: 146 MMHG | DIASTOLIC BLOOD PRESSURE: 79 MMHG | HEART RATE: 75 BPM | BODY MASS INDEX: 20.47 KG/M2

## 2025-07-09 DIAGNOSIS — B18.2 CHRONIC HEPATITIS C WITHOUT HEPATIC COMA: Primary | ICD-10-CM

## 2025-07-09 LAB
ALPHA-FETOPROTEIN: <2 NG/ML (ref 0–8.3)
AMPHET+METHAMPHET UR QL: POSITIVE
AMPHETAMINES UR QL: POSITIVE
BARBITURATES UR QL SCN: NEGATIVE
BENZODIAZ UR QL SCN: NEGATIVE
BUPRENORPHINE SERPL-MCNC: NEGATIVE NG/ML
CANNABINOIDS SERPL QL: POSITIVE
COCAINE UR QL: NEGATIVE
FENTANYL UR-MCNC: NEGATIVE NG/ML
HBV SURFACE AB SER RIA-ACNC: NORMAL
HBV SURFACE AG SERPL QL IA: NORMAL
HIV 1+2 AB+HIV1 P24 AG SERPL QL IA: NORMAL
INR PPP: 1.01 (ref 0.9–1.1)
METHADONE UR QL SCN: POSITIVE
OPIATES UR QL: NEGATIVE
OXYCODONE UR QL SCN: NEGATIVE
PCP UR QL SCN: NEGATIVE
PROTHROMBIN TIME: 13.9 SECONDS (ref 12.5–15.2)
TRICYCLICS UR QL SCN: NEGATIVE

## 2025-07-09 PROCEDURE — 87902 NFCT AGT GNTYP ALYS HEP C: CPT

## 2025-07-09 PROCEDURE — 82105 ALPHA-FETOPROTEIN SERUM: CPT

## 2025-07-09 PROCEDURE — 86708 HEPATITIS A ANTIBODY: CPT

## 2025-07-09 PROCEDURE — 87340 HEPATITIS B SURFACE AG IA: CPT

## 2025-07-09 PROCEDURE — 86704 HEP B CORE ANTIBODY TOTAL: CPT

## 2025-07-09 PROCEDURE — 86706 HEP B SURFACE ANTIBODY: CPT

## 2025-07-09 PROCEDURE — 80307 DRUG TEST PRSMV CHEM ANLYZR: CPT

## 2025-07-09 PROCEDURE — G0432 EIA HIV-1/HIV-2 SCREEN: HCPCS

## 2025-07-09 PROCEDURE — 85610 PROTHROMBIN TIME: CPT

## 2025-07-09 RX ORDER — SACCHAROMYCES BOULARDII 250 MG
250 CAPSULE ORAL 2 TIMES DAILY
COMMUNITY

## 2025-07-09 NOTE — PROGRESS NOTES
No chief complaint on file.    James Hunt is a 40 y.o. male who presents to the office today at the request of Kandis Guidry PA-C for No chief complaint on file..    HPI    He found out about having Hepatitis C infection approx 7 years ago. He has not had prior treatment for hepatitis. He reports a history of Hepatitis B that he thinks is dormant. Reports no known personal history of liver disease including other viral hepatitis. There is no known family history of liver disease or cirrhosis. He admits to previous IVDU and intranasal drug use. He does have nonprofessional tattoos. Admits to having previous alcoholism. He does currently drink alcohol, only a couple times of drinking beer per month. He denies current illicit drug use except marijuana. Patient had CT scan of his abdomen on 6/4/25 which showed no problems with the liver. He has not had recent labs. He has not had previous vaccinations for Hepatitis A and B. He is currently full time job doing factory work. The patient receives support from his mother..     Review of Systems   Constitutional:  Negative for activity change, appetite change and fatigue.   HENT:  Negative for trouble swallowing and voice change.    Respiratory:  Negative for cough and choking.    Cardiovascular:  Negative for leg swelling.   Gastrointestinal:  Negative for abdominal distention, abdominal pain, anal bleeding, blood in stool, constipation, diarrhea, nausea, rectal pain and vomiting.   Endocrine: Negative for polyphagia.   Genitourinary:  Negative for flank pain.   Musculoskeletal:  Positive for back pain and neck pain.   Skin:  Negative for color change and pallor.   Allergic/Immunologic: Negative for food allergies.   Neurological:  Negative for weakness.   Psychiatric/Behavioral:  Negative for confusion.        ACTIVE PROBLEMS:   Specialty Problems    None      PAST MEDICAL HISTORY:  Past Medical History:   Diagnosis Date    Hepatitis C        SURGICAL HISTORY:  Past  "Surgical History:   Procedure Laterality Date    COLONOSCOPY N/A 6/3/2025    Procedure: COLONOSCOPY;  Surgeon: Sully Hoskins MD;  Location: [facility];  Service: Gastroenterology;  Laterality: N/A;    TEETH EXTRACTION         FAMILY HISTORY:  Family History   Problem Relation Age of Onset    No Known Problems Mother     Colon cancer Paternal Grandfather        SOCIAL HISTORY:  Social History     Tobacco Use    Smoking status: Every Day     Current packs/day: 0.50     Average packs/day: 0.5 packs/day for 23.0 years (11.5 ttl pk-yrs)     Types: Cigarettes     Passive exposure: Current    Smokeless tobacco: Former     Types: Chew   Substance Use Topics    Alcohol use: Yes     Comment: social       CURRENT MEDICATION:    Current Outpatient Medications:     gabapentin (NEURONTIN) 800 MG tablet, 1 tablet Orally three times daily., Disp: , Rfl:     omeprazole (priLOSEC) 20 MG capsule, Daily., Disp: , Rfl:     saccharomyces boulardii (FLORASTOR) 250 MG capsule, Take 1 capsule by mouth 2 (Two) Times a Day., Disp: , Rfl:     tiZANidine (ZANAFLEX) 4 MG tablet, Daily., Disp: , Rfl:     vitamin D (ERGOCALCIFEROL) 1.25 MG (77492 UT) capsule capsule, , Disp: , Rfl:     ALLERGIES:  Patient has no known allergies.    VISIT VITALS:  /79 (BP Location: Right arm, Patient Position: Sitting, Cuff Size: Adult)   Pulse 75   Ht 177.8 cm (70\")   Wt 64.9 kg (143 lb)   SpO2 99%   BMI 20.52 kg/m²     PHYSICAL EXAMINATION:  Physical Exam  Constitutional:       General: He is not in acute distress.     Appearance: He is well-developed. He is not diaphoretic.   HENT:      Head: Normocephalic and atraumatic.      Right Ear: External ear normal.      Left Ear: External ear normal.      Nose: Nose normal.      Mouth/Throat:      Pharynx: No oropharyngeal exudate.   Eyes:      General: No scleral icterus.        Right eye: No discharge.         Left eye: No discharge.      Conjunctiva/sclera: Conjunctivae normal.      Pupils: " Pupils are equal, round, and reactive to light.   Neck:      Thyroid: No thyromegaly.      Vascular: No JVD.      Trachea: No tracheal deviation.   Cardiovascular:      Rate and Rhythm: Normal rate and regular rhythm.      Heart sounds: Normal heart sounds. No murmur heard.     No friction rub. No gallop.   Pulmonary:      Effort: Pulmonary effort is normal. No respiratory distress.      Breath sounds: Normal breath sounds. No stridor. No wheezing or rales.   Chest:      Chest wall: No tenderness.   Abdominal:      General: Bowel sounds are normal. There is no distension.      Palpations: Abdomen is soft. There is no mass.      Tenderness: There is abdominal tenderness. There is no guarding or rebound.      Hernia: No hernia is present.   Genitourinary:     Rectum: Guaiac result negative.   Musculoskeletal:      Cervical back: Normal range of motion and neck supple.   Lymphadenopathy:      Cervical: No cervical adenopathy.   Skin:     General: Skin is warm and dry.      Coloration: Skin is not pale.      Findings: No erythema or rash.   Neurological:      Mental Status: He is alert and oriented to person, place, and time.      Cranial Nerves: No cranial nerve deficit.      Motor: No abnormal muscle tone.      Coordination: Coordination normal.      Deep Tendon Reflexes: Reflexes are normal and symmetric. Reflexes normal.   Psychiatric:         Behavior: Behavior normal.         Thought Content: Thought content normal.         Judgment: Judgment normal.         Assessment & Plan      Diagnosis Plan   1. Chronic hepatitis C without hepatic coma  AFP Tumor Marker    HCV FibroSURE    Hepatitis A Antibody, Total    Hepatitis B Core Antibody, Total    Hepatitis B Surface Antibody    Hepatitis B Surface Antigen    HIV-1 & HIV-2 Antibodies    Protime-INR    Urine Drug Screen - Urine, Clean Catch    Hepatitis C Genotype        The patient will complete initial lab work and liver US in order to begin Hepatitis C treatment.   The patient will return in two weeks to discuss results and begin treatment if warranted.   Return in about 1 week (around 7/16/2025) for Recheck.           Syed Thorpe PA-C

## 2025-07-10 LAB
HAV AB SER QL IA: POSITIVE
HBV CORE AB SERPL QL IA: NEGATIVE

## 2025-07-11 LAB
A2 MACROGLOB SERPL-MCNC: 147 MG/DL (ref 110–276)
ALT SERPL W P-5'-P-CCNC: 63 IU/L (ref 0–55)
APO A-I SERPL-MCNC: 158 MG/DL (ref 101–178)
BILIRUB SERPL-MCNC: 0.4 MG/DL (ref 0–1.2)
FIBROSIS SCORING:: ABNORMAL
FIBROSIS STAGE SERPL QL: ABNORMAL
GGT SERPL-CCNC: 35 IU/L (ref 0–65)
HAPTOGLOB SERPL-MCNC: 97 MG/DL (ref 17–317)
HCV AB SER QL: ABNORMAL
LABORATORY COMMENT REPORT: ABNORMAL
LIVER FIBR SCORE SERPL CALC.FIBROSURE: 0.1 (ref 0–0.21)
NECROINFLAMM ACTIVITY SCORING:: ABNORMAL
NECROINFLAMMATORY ACT GRADE SERPL QL: ABNORMAL
NECROINFLAMMATORY ACT SCORE SERPL: 0.31 (ref 0–0.17)
SERVICE CMNT-IMP: ABNORMAL
TEST PERFORMANCE INFO SPEC: ABNORMAL

## 2025-07-12 LAB — HCV GENTYP SERPL NAA+PROBE: NORMAL

## 2025-07-16 ENCOUNTER — SPECIALTY PHARMACY (OUTPATIENT)
Dept: PHARMACY | Facility: HOSPITAL | Age: 40
End: 2025-07-16
Payer: COMMERCIAL

## 2025-07-16 VITALS
BODY MASS INDEX: 20.47 KG/M2 | SYSTOLIC BLOOD PRESSURE: 138 MMHG | DIASTOLIC BLOOD PRESSURE: 72 MMHG | HEART RATE: 84 BPM | OXYGEN SATURATION: 98 % | WEIGHT: 143 LBS | HEIGHT: 70 IN

## 2025-07-16 DIAGNOSIS — B18.2 CHRONIC HEPATITIS C WITHOUT HEPATIC COMA: Primary | ICD-10-CM

## 2025-07-16 RX ORDER — VELPATASVIR AND SOFOSBUVIR 100; 400 MG/1; MG/1
1 TABLET, FILM COATED ORAL DAILY
Qty: 28 TABLET | Refills: 2 | Status: SHIPPED | OUTPATIENT
Start: 2025-07-16 | End: 2025-07-16 | Stop reason: SDUPTHER

## 2025-07-16 RX ORDER — VELPATASVIR AND SOFOSBUVIR 100; 400 MG/1; MG/1
1 TABLET, FILM COATED ORAL DAILY
Qty: 28 TABLET | Refills: 2 | Status: SHIPPED | OUTPATIENT
Start: 2025-07-16

## 2025-07-16 NOTE — PROGRESS NOTES
Specialty Pharmacy Patient Management Program  Hepatitis C Initial Assessment     James Hunt is a 40 y.o. male with Hepatitis C seen in the [facility] and enrolled in the Patient Management program offered by [facility]. An initial outreach was conducted, including assessment of therapy appropriateness and specialty medication education for Epclusa (sofosbuvir/velpatasvir). The patient was introduced to services offered by [facility], including: regular assessments, refill coordination, curbside pick-up or mail order delivery options, prior authorization maintenance, and financial assistance programs as applicable. The patient was also provided with contact information for the pharmacy team.     Insurance Coverage & Financial Support  medicaid     Relevant Past Medical History and Comorbidities  Relevant medical history and concomitant health conditions were discussed with the patient. The patient's chart has been reviewed for relevant past medical history and comorbid health conditions and updated as necessary.   Past Medical History:   Diagnosis Date    Hepatitis C      Social History     Socioeconomic History    Marital status: Single   Tobacco Use    Smoking status: Every Day     Current packs/day: 0.50     Average packs/day: 0.5 packs/day for 23.0 years (11.5 ttl pk-yrs)     Types: Cigarettes     Passive exposure: Current    Smokeless tobacco: Former     Types: Chew   Vaping Use    Vaping status: Some Days    Substances: Nicotine, Flavoring, 5% Nicotine    Devices: Disposable   Substance and Sexual Activity    Alcohol use: Yes     Comment: social    Drug use: Yes     Types: Marijuana    Sexual activity: Defer     Problem list reviewed by Mel Vega, PharmD on 7/16/2025 at 10:11 AM    Allergies  Known allergies and reactions were discussed with the patient. The patient's chart has been reviewed for allergy information and updated as  necessary.   Patient has no known allergies.  Allergies reviewed by Mel Vega, PharmD on 7/16/2025 at  9:56 AM    Current Medication List  This medication list has been reviewed with the patient and evaluated for any interactions or necessary modifications/recommendations, and updated to include all prescription medications, OTC medications, and supplements the patient is currently taking. This list reflects what is contained in the patient's profile, which has also been marked as reviewed to communicate to other providers it is the most up to date version of the patient's current medication therapy.     Current Outpatient Medications:     gabapentin (NEURONTIN) 800 MG tablet, 1 tablet Orally three times daily., Disp: , Rfl:     omeprazole (priLOSEC) 20 MG capsule, Daily., Disp: , Rfl:     saccharomyces boulardii (FLORASTOR) 250 MG capsule, Take 1 capsule by mouth Daily., Disp: , Rfl:     tiZANidine (ZANAFLEX) 4 MG tablet, Take 1 tablet by mouth Every 8 (Eight) Hours As Needed for Muscle Spasms., Disp: , Rfl:     Sofosbuvir-Velpatasvir (Epclusa) 400-100 MG tablet, Take 1 tablet by mouth Daily., Disp: 28 tablet, Rfl: 2    vitamin D (ERGOCALCIFEROL) 1.25 MG (74882 UT) capsule capsule, Take 1 capsule by mouth Every 7 (Seven) Days., Disp: , Rfl:   Medicines reviewed by Mel Vega, PharmD on 7/16/2025 at  9:57 AM    Drug Interactions  Epclusa+omeprazople: Epclusa should be administered with food and taken 4 hours before proton pump inhibitor at max dose of omeprazole 20 mg.     Evaluation  Prior hepatitis C treatment: none   Co-infection status  Hepatitis B: negative  HIV: non-reactive  FIB-4: 1.02  Fibrosis score: F0  Score method: FibroSURE  Child-Szymanski classification if cirrhotic: na    Relevant Laboratory Values  Lab Results   Component Value Date    HCVGENOTYPE 2a/2c 07/09/2025    HEPATITISC 54519 04/14/2025     Fibrosis Score   Date/Time Value Ref Range Status   07/09/2025 10:19 AM 0.10 0.00 - 0.21  Final     Fibrosis Scoring:   Date/Time Value Ref Range Status   07/09/2025 10:19 AM Comment  Final     Comment:          <=0.21 = Stage F0 - No fibrosis  0.21 - 0.27 = Stage F0 - F1  0.27 - 0.31 = Stage F1 - Portal fibrosis  0.31 - 0.48 = Stage F1 - F2  0.48 - 0.58 = Stage F2 - Bridging fibrosis with few septa  0.58 - 0.72 = Stage F3 - Bridging fibrosis with many septa  0.72 - 0.74 = Stage F3 - F4        >0.74 = Stage F4 - Cirrhosis     Lab Results   Component Value Date    HAV Positive (A) 07/09/2025    HEPAIGM Non-Reactive 04/14/2025    HEPBCAB Negative 07/09/2025    HEPBIGMCORE Non-Reactive 04/14/2025    HEPBSAB Non-Reactive 07/09/2025    HEPBSAG Non-Reactive 07/09/2025    HEPCVIRUSABY Reactive (A) 04/14/2025    KCO0YAC3 Non-Reactive 07/09/2025     Lab Results   Component Value Date    GLUCOSE 122 (H) 06/03/2025    BUN 17.9 06/03/2025    CREATININE 0.76 06/03/2025    BCR 23.6 06/03/2025     06/03/2025    K 3.8 06/03/2025     06/03/2025    CO2 20.6 (L) 06/03/2025    CALCIUM 9.2 06/03/2025    PROTEINTOT 7.1 06/03/2025    ALBUMIN 4.4 06/03/2025    ALT 56 (H) 06/03/2025    AST 39 06/03/2025    ALKPHOS 85 06/03/2025    BILITOT 0.8 06/03/2025    ANIONGAP 13.4 06/03/2025    MG 2.7 (H) 01/01/2025     Lab Results   Component Value Date    WBC 4.81 06/03/2025    HGB 13.6 06/03/2025    HCT 40.6 06/03/2025     06/03/2025    INR 1.01 07/09/2025     Lab Value Review  The above lab values have been reviewed; see plan for any specialty medication(s) dose adjustment(s) or recommendations.    Initial Education Provided for Specialty Medication  The patient has been provided with the following education and any applicable administration techniques (i.e. self-injection) have been demonstrated for the therapies indicated. All questions and concerns have been addressed prior to the patient receiving the medication, and the patient has verbalized understanding of the education and any materials provided. Additional  patient education shall be provided and documented upon request by the patient, provider, or payer.      Epclusa (sofosbuvir/velpatasvir)         Medication Expectations   Why am I taking this medication? This is indicated for patients with hepatitis C virus (HCV) genotypes 1-6 without cirrhosis, genotypes 1, 2, 4-6 with compensated cirrhosis, or genotype 3 with compensated cirrhosis if RYAN Y93H is negative. It is also indicated for patients with genotypes 1-6 with decompensated cirrhosis, however this will require longer treatment and/or the addition of ribavirin.   What should I expect while on this medication? There is a > 90% cure rate of hepatitis C with completed treatment.   How does the medication work? Sofosbuvir is an inhibitor of HCV NS5B protease, necessary for replication of the virus.    Velpatasvir is an inhibitor of HCV NS5A, essential for viral replication and assembly.   How long will I be on this medication for? You will be on this medication for 12 weeks.   How do I take this medication? Sofosbuvir/velpatasvir is 1 tablet taken at the same time each day with or without food.   What are some possible side effects? The most common side effects are headache, fatigue, nausea, insomnia, and common cold symptoms.   What happens if I miss a dose? If it has been less than 18 hours, take the missed dose as soon as you can. Take your next dose at the usual time.  If it has been more than 18 hours, do not take your missed dose, take your next dose as usual.            Medication Safety   What are things I should warn my doctor immediately about? Allergic reaction (itching or hives, swelling in your face or hands, swelling or tingling in your mouth or throat, chest tightness, or trouble breathing); signs of liver failure including dark urine, pale stools, nausea, vomiting, loss of appetite, stomach pain, yellow skin or eyes.   What are things that I should be cautious of? Headache, nausea, tiredness or  weakness.   What are some medications that can interact with this one? Some medicines can affect how sofosbuvir/velpatasvir works. Tell your doctor if you are using any of the following:  Rifampin, rifabutin, rifapentin, carbamazepine, oxcarbazepine, phenytoin, phenobarbital, Saman's wort, or amiodarone   Medicine to treat HIV infection (including tipranavir, efavirenz, or ritonavir)  If you are taking a PPI, your therapy may need to be temporarily discontinued. If PPI therapy is necessary, omeprazole 20 mg is preferred and should be taken at least 4 hours after sofosbuvir/velpatasvir.            Medication Storage/Handling   How should I handle this medication? Keep this medication out of reach of pets/children in original container.     How does this medication need to be stored? Store at room temperature.   How should I dispose of this medication? You should not have any medication left over. If you do reach out to your pharmacist of doctor.            Resources/Support   How can I remind myself to take this medication? You can download a reminder enriqueta on your phone or use a calendar to help with your once daily reminder.   Is financial support available?  Yes, [employer] can provide co-pay cards if you have commercial insurance or patient assistance if you have Medicare or no insurance.    Which vaccines are recommended for me? Talk with your doctor about the hepatitis B vaccine.           Adherence and Self-Administration  Barriers to Patient Adherence and/or Self-Administration: none   Methods for Supporting Patient Adherence and/or Self-Administration: N/A     Goals of Therapy  Patient Goals of Therapy: take medication daily with no missed doses   Clinical Goals or Therapeutic Targets, If Applicable: sustained virological response (SVR) at 12 weeks post-treatment    Reassessment Plan & Follow-Up  Hepatitis C Therapy Plan: Patient has Hepatitis C, genotype 2a/2c (F0)(FIB-4 =1.02) and is treatment naïve.  Patient  has been prescribed Epclusa 1 tablet daily x 12 weeks.  Will begin prior authorization, if applicable. Medication education and counseling provided.  Other Medication Therapy Changes: none  Additional Plans, Therapy Recommendations, or Therapy Problems to Be Addressed:     Immunizations Needed: Hepatitis B vaccination recommended. Patient showing immunity to Hepatitis A  Epclusa should be administered with food and taken 4 hours before proton pump inhibitor at max dose of omeprazole 20 mg  Patient counseled at initial appointment  Prior authorization for Epclusa needed, will complete today.   (Key: [id number] approved  Address and phone number verified with patient verbally and he would like medication to be mailed  Pharmacist to perform regular reassessments approximately every (4) weeks from the previous assessment.  Welcome information and patient satisfaction survey to be sent by retail team with patient's initial fill.  Specialty Pharmacy team to set up future refill outreaches and coordinate prescription delivery.     Attestation  Therapeutic appropriateness: Appropriate   I attest the patient was actively involved in and has agreed to the above plan of care. I attest that the initiated specialty medication(s) are appropriate for the patient based on my assessment. If the prescribed therapy is at any point deemed not appropriate based on the current or future assessments, a consultation will be initiated with the patient's specialty care provider to determine the best course of action. The revised plan of therapy will be documented along with any required assessments and/or additional patient education provided.     Mel Vega, PharmD  07/16/25 10:11 EDT

## 2025-07-16 NOTE — PROGRESS NOTES
"No chief complaint on file.      James Hunt is a 40 y.o. male who presents to the office today for follow up appointment for No chief complaint on file.  .    HPI    The patient was seen for a followup visit to discuss results of initial testing for Hepatitis C.  CT scan of abdomen and pelvis on 6/4/25:  Small volume of free fluid in the right upper quadrant.  No acute process seen in the spleen, liver, pancreas, adrenal glands,  and kidneys.    See lab results below.     Relevant Laboratory Values   Lab Results   Component Value Date    GLUCOSE 122 (H) 06/03/2025    CALCIUM 9.2 06/03/2025     06/03/2025    K 3.8 06/03/2025    CO2 20.6 (L) 06/03/2025     06/03/2025    BUN 17.9 06/03/2025    CREATININE 0.76 06/03/2025    BCR 23.6 06/03/2025    ANIONGAP 13.4 06/03/2025    AST 39 06/03/2025    ALT 56 (H) 06/03/2025      Lab Results   Component Value Date    WBC 4.81 06/03/2025    HGB 13.6 06/03/2025    HCT 40.6 06/03/2025    MCV 92.9 06/03/2025     06/03/2025    No results found for: \"HCVRNA\"  61,300   Hepatitis C Genotype   Date Value Ref Range Status   07/09/2025 2a/2c  Final      Lab Results   Component Value Date    HAV Positive (A) 07/09/2025    HEPAIGM Non-Reactive 04/14/2025    HEPBCAB Negative 07/09/2025        Hep B surface antibody nonreactive  Hep B surface antigen  negative;    HIV nonreactive  PT-INR Normal;  HCV fibrosure F0  AFP tumor marker normal        Review of Systems   Constitutional:  Negative for activity change, appetite change and fatigue.   HENT:  Negative for trouble swallowing and voice change.    Respiratory:  Negative for cough and choking.    Cardiovascular:  Negative for leg swelling.   Gastrointestinal:  Negative for abdominal distention, abdominal pain, anal bleeding, blood in stool, constipation, diarrhea, nausea, rectal pain and vomiting.   Endocrine: Negative for polyphagia.   Genitourinary:  Negative for flank pain.   Musculoskeletal:  Negative for back pain. " "  Skin:  Negative for color change and pallor.   Allergic/Immunologic: Negative for food allergies.   Neurological:  Negative for weakness.   Psychiatric/Behavioral:  Negative for confusion.        ACTIVE PROBLEMS:   Specialty Problems    None      PAST MEDICAL HISTORY:  Past Medical History:   Diagnosis Date    Hepatitis C        SURGICAL HISTORY:  Past Surgical History:   Procedure Laterality Date    COLONOSCOPY N/A 6/3/2025    Procedure: COLONOSCOPY;  Surgeon: Sully Hoskins MD;  Location: [facility];  Service: Gastroenterology;  Laterality: N/A;    TEETH EXTRACTION         FAMILY HISTORY:  Family History   Problem Relation Age of Onset    No Known Problems Mother     Colon cancer Paternal Grandfather        SOCIAL HISTORY:  Social History     Tobacco Use    Smoking status: Every Day     Current packs/day: 0.50     Average packs/day: 0.5 packs/day for 23.0 years (11.5 ttl pk-yrs)     Types: Cigarettes     Passive exposure: Current    Smokeless tobacco: Former     Types: Chew   Substance Use Topics    Alcohol use: Yes     Comment: social       CURRENT MEDICATION:    Current Outpatient Medications:     gabapentin (NEURONTIN) 800 MG tablet, 1 tablet Orally three times daily., Disp: , Rfl:     omeprazole (priLOSEC) 20 MG capsule, Daily., Disp: , Rfl:     saccharomyces boulardii (FLORASTOR) 250 MG capsule, Take 1 capsule by mouth 2 (Two) Times a Day., Disp: , Rfl:     tiZANidine (ZANAFLEX) 4 MG tablet, Daily., Disp: , Rfl:     vitamin D (ERGOCALCIFEROL) 1.25 MG (07631 UT) capsule capsule, , Disp: , Rfl:     ALLERGIES:  Patient has no known allergies.    VISIT VITALS:  /72 (BP Location: Right arm, Patient Position: Sitting, Cuff Size: Adult)   Pulse 84   Ht 177.8 cm (70\")   Wt 64.9 kg (143 lb)   SpO2 98%   BMI 20.52 kg/m²     Physical Exam  Constitutional:       General: He is not in acute distress.     Appearance: He is well-developed. He is not diaphoretic.   HENT:      Head: Normocephalic and " atraumatic.      Right Ear: External ear normal.      Left Ear: External ear normal.      Nose: Nose normal.      Mouth/Throat:      Pharynx: No oropharyngeal exudate.   Eyes:      General: No scleral icterus.        Right eye: No discharge.         Left eye: No discharge.      Conjunctiva/sclera: Conjunctivae normal.      Pupils: Pupils are equal, round, and reactive to light.   Neck:      Thyroid: No thyromegaly.      Vascular: No JVD.      Trachea: No tracheal deviation.   Cardiovascular:      Rate and Rhythm: Normal rate and regular rhythm.      Heart sounds: Normal heart sounds. No murmur heard.     No friction rub. No gallop.   Pulmonary:      Effort: Pulmonary effort is normal. No respiratory distress.      Breath sounds: Normal breath sounds. No stridor. No wheezing or rales.   Chest:      Chest wall: No tenderness.   Abdominal:      General: Bowel sounds are normal. There is no distension.      Palpations: Abdomen is soft. There is no mass.      Tenderness: There is no abdominal tenderness. There is no guarding or rebound.      Hernia: No hernia is present.   Genitourinary:     Rectum: Guaiac result negative.   Musculoskeletal:      Cervical back: Normal range of motion and neck supple.   Lymphadenopathy:      Cervical: No cervical adenopathy.   Skin:     General: Skin is warm and dry.      Coloration: Skin is not pale.      Findings: No erythema or rash.   Neurological:      Mental Status: He is alert and oriented to person, place, and time.      Cranial Nerves: No cranial nerve deficit.      Motor: No abnormal muscle tone.      Coordination: Coordination normal.      Deep Tendon Reflexes: Reflexes are normal and symmetric. Reflexes normal.   Psychiatric:         Behavior: Behavior normal.         Thought Content: Thought content normal.         Judgment: Judgment normal.       Assessment & Plan      Diagnosis Plan   1. Chronic hepatitis C without hepatic coma  CT Abdomen With & Without Contrast        CT  scan of abdomen with liver protocol will be scheduled to follow up on free fluid found in right upper quadrant along with hemangioma identified on previous imaging.  Will make Hep C treatment decision once results are obtained.  Return in about 4 weeks (around 8/13/2025) for Recheck.         Syed Thorpe PA-C

## 2025-07-17 ENCOUNTER — HOSPITAL ENCOUNTER (OUTPATIENT)
Dept: CT IMAGING | Facility: HOSPITAL | Age: 40
Discharge: HOME | End: 2025-07-17
Admitting: PHYSICIAN ASSISTANT
Payer: COMMERCIAL

## 2025-07-17 DIAGNOSIS — B18.2 CHRONIC HEPATITIS C WITHOUT HEPATIC COMA: ICD-10-CM

## 2025-07-17 PROCEDURE — 25510000001 IOPAMIDOL 61 % SOLUTION: Performed by: PHYSICIAN ASSISTANT

## 2025-07-17 PROCEDURE — 74170 CT ABD WO CNTRST FLWD CNTRST: CPT

## 2025-07-17 RX ORDER — IOPAMIDOL 612 MG/ML
100 INJECTION, SOLUTION INTRAVASCULAR
Status: COMPLETED | OUTPATIENT
Start: 2025-07-17 | End: 2025-07-17

## 2025-07-17 RX ADMIN — IOPAMIDOL 100 ML: 612 INJECTION, SOLUTION INTRAVENOUS at 08:55

## 2025-07-19 ENCOUNTER — HOSPITAL ENCOUNTER (OUTPATIENT)
Dept: NUCLEAR MEDICINE | Facility: HOSPITAL | Age: 40
Discharge: HOME | End: 2025-07-19
Payer: COMMERCIAL

## 2025-07-19 DIAGNOSIS — R93.5 ABNORMAL CT OF THE ABDOMEN: ICD-10-CM

## 2025-07-19 PROCEDURE — 78227 HEPATOBIL SYST IMAGE W/DRUG: CPT

## 2025-07-19 PROCEDURE — A9537 TC99M MEBROFENIN: HCPCS

## 2025-07-19 PROCEDURE — 34310000005 TECHNETIUM TC 99M MEBROFENIN KIT

## 2025-07-19 PROCEDURE — 25010000002 SINCALIDE PER 5 MCG

## 2025-07-19 RX ORDER — KIT FOR THE PREPARATION OF TECHNETIUM TC 99M MEBROFENIN 45 MG/10ML
1 INJECTION, POWDER, LYOPHILIZED, FOR SOLUTION INTRAVENOUS
Status: COMPLETED | OUTPATIENT
Start: 2025-07-19 | End: 2025-07-19

## 2025-07-19 RX ORDER — SINCALIDE 5 UG/5ML
0.04 INJECTION, POWDER, LYOPHILIZED, FOR SOLUTION INTRAVENOUS
Status: COMPLETED | OUTPATIENT
Start: 2025-07-19 | End: 2025-07-19

## 2025-07-19 RX ADMIN — MEBROFENIN 1 DOSE: 45 INJECTION, POWDER, LYOPHILIZED, FOR SOLUTION INTRAVENOUS at 12:00

## 2025-07-19 RX ADMIN — SINCALIDE 2.6 MCG: 5 INJECTION, POWDER, LYOPHILIZED, FOR SOLUTION INTRAVENOUS at 13:00

## 2025-07-21 ENCOUNTER — RESULTS FOLLOW-UP (OUTPATIENT)
Dept: GASTROENTEROLOGY | Facility: CLINIC | Age: 40
End: 2025-07-21
Payer: COMMERCIAL

## 2025-08-13 ENCOUNTER — SPECIALTY PHARMACY (OUTPATIENT)
Dept: PHARMACY | Facility: HOSPITAL | Age: 40
End: 2025-08-13
Payer: COMMERCIAL

## 2025-08-13 ENCOUNTER — LAB (OUTPATIENT)
Dept: LAB | Facility: HOSPITAL | Age: 40
End: 2025-08-13
Payer: COMMERCIAL

## 2025-08-13 VITALS
DIASTOLIC BLOOD PRESSURE: 63 MMHG | SYSTOLIC BLOOD PRESSURE: 109 MMHG | BODY MASS INDEX: 21.27 KG/M2 | HEIGHT: 70 IN | HEART RATE: 66 BPM | OXYGEN SATURATION: 99 % | WEIGHT: 148.6 LBS | RESPIRATION RATE: 18 BRPM

## 2025-08-13 DIAGNOSIS — B18.2 CHRONIC HEPATITIS C WITHOUT HEPATIC COMA: Primary | ICD-10-CM

## 2025-08-13 LAB
ALBUMIN SERPL-MCNC: 4.5 G/DL (ref 3.5–5.2)
ALBUMIN/GLOB SERPL: 1.7 G/DL
ALP SERPL-CCNC: 76 U/L (ref 39–117)
ALT SERPL W P-5'-P-CCNC: 13 U/L (ref 1–41)
ANION GAP SERPL CALCULATED.3IONS-SCNC: 10.9 MMOL/L (ref 5–15)
AST SERPL-CCNC: 19 U/L (ref 1–40)
BILIRUB SERPL-MCNC: 0.2 MG/DL (ref 0–1.2)
BUN SERPL-MCNC: 18 MG/DL (ref 6–20)
BUN/CREAT SERPL: 21.2 (ref 7–25)
CALCIUM SPEC-SCNC: 9.9 MG/DL (ref 8.6–10.5)
CHLORIDE SERPL-SCNC: 102 MMOL/L (ref 98–107)
CO2 SERPL-SCNC: 28.1 MMOL/L (ref 22–29)
CREAT SERPL-MCNC: 0.85 MG/DL (ref 0.76–1.27)
EGFRCR SERPLBLD CKD-EPI 2021: 113.4 ML/MIN/1.73
GLOBULIN UR ELPH-MCNC: 2.6 GM/DL
GLUCOSE SERPL-MCNC: 88 MG/DL (ref 65–99)
POTASSIUM SERPL-SCNC: 4.9 MMOL/L (ref 3.5–5.2)
PROT SERPL-MCNC: 7.1 G/DL (ref 6–8.5)
SODIUM SERPL-SCNC: 141 MMOL/L (ref 136–145)

## 2025-08-13 PROCEDURE — 87522 HEPATITIS C REVRS TRNSCRPJ: CPT

## 2025-08-13 PROCEDURE — 80053 COMPREHEN METABOLIC PANEL: CPT

## 2025-08-13 PROCEDURE — 36415 COLL VENOUS BLD VENIPUNCTURE: CPT

## 2025-08-18 LAB
HCV RNA SERPL NAA+PROBE-ACNC: NORMAL IU/ML
REF LAB TEST REF RANGE: NORMAL

## 2025-08-21 ENCOUNTER — OFFICE VISIT (OUTPATIENT)
Dept: GASTROENTEROLOGY | Facility: CLINIC | Age: 40
End: 2025-08-21
Payer: COMMERCIAL

## 2025-08-21 VITALS
HEIGHT: 70 IN | SYSTOLIC BLOOD PRESSURE: 124 MMHG | WEIGHT: 148.8 LBS | DIASTOLIC BLOOD PRESSURE: 69 MMHG | HEART RATE: 55 BPM | BODY MASS INDEX: 21.3 KG/M2

## 2025-08-21 DIAGNOSIS — K76.9 LIVER LESION: ICD-10-CM

## 2025-08-21 DIAGNOSIS — R63.4 UNINTENTIONAL WEIGHT LOSS: Primary | ICD-10-CM

## 2025-08-21 DIAGNOSIS — B18.2 CHRONIC HEPATITIS C WITHOUT HEPATIC COMA: ICD-10-CM

## 2025-08-23 LAB
ENDOMYSIUM IGA SER QL: NEGATIVE
GLIADIN PEPTIDE IGA SER-ACNC: 3 UNITS (ref 0–19)
GLIADIN PEPTIDE IGG SER-ACNC: 2 UNITS (ref 0–19)
IGA SERPL-MCNC: 139 MG/DL (ref 90–386)
TTG IGA SER-ACNC: <2 U/ML (ref 0–3)
TTG IGG SER-ACNC: 3 U/ML (ref 0–5)

## (undated) DEVICE — ENDOGATOR AUXILIARY WATER JET CONNECTOR: Brand: ENDOGATOR

## (undated) DEVICE — SNAR POLYP CAPTIFLX MICRO OVL 13MM 240CM

## (undated) DEVICE — TRAP,MUCUS SPECIMEN,40CC: Brand: MEDLINE

## (undated) DEVICE — CONN Y IRR DISP 1P/U

## (undated) DEVICE — DEFENDO AIR WATER SUCTION AND BIOPSY VALVE KIT FOR  OLYMPUS: Brand: DEFENDO AIR/WATER/SUCTION AND BIOPSY VALVE

## (undated) DEVICE — Device

## (undated) DEVICE — ENDOGATOR TUBING FOR ENDOGATOR EGP-100 IRRIGATION PUMP,OLYMPUS OFP PUMP, OLYMPUS AFU-100 PUMP AND ERBE EIP2 PUMP: Brand: ENDOGATOR